# Patient Record
Sex: FEMALE | URBAN - METROPOLITAN AREA
[De-identification: names, ages, dates, MRNs, and addresses within clinical notes are randomized per-mention and may not be internally consistent; named-entity substitution may affect disease eponyms.]

---

## 2018-12-05 ENCOUNTER — APPOINTMENT (RX ONLY)
Dept: URBAN - METROPOLITAN AREA CLINIC 87 | Facility: CLINIC | Age: 83
Setting detail: DERMATOLOGY
End: 2018-12-05

## 2018-12-05 PROBLEM — K21.9 GASTRO-ESOPHAGEAL REFLUX DISEASE WITHOUT ESOPHAGITIS: Status: ACTIVE | Noted: 2018-12-05

## 2018-12-05 PROBLEM — C44.612 BASAL CELL CARCINOMA OF SKIN OF RIGHT UPPER LIMB, INCLUDING SHOULDER: Status: ACTIVE | Noted: 2018-12-05

## 2018-12-05 PROBLEM — Z85.828 PERSONAL HISTORY OF OTHER MALIGNANT NEOPLASM OF SKIN: Status: ACTIVE | Noted: 2018-12-05

## 2018-12-05 PROCEDURE — ? COUNSELING

## 2018-12-05 PROCEDURE — 99202 OFFICE O/P NEW SF 15 MIN: CPT

## 2018-12-05 PROCEDURE — ? OTHER

## 2018-12-05 NOTE — HPI: SKIN LESION (BASAL CELL CARCINOMA)
How Severe Is Your Skin Cancer?: severe
Is This A New Presentation, Or A Follow-Up?: Basal Cell Carcinoma
Location From Outside Provider (Will Override Previously Chosen Location): Right anterior shoulder
When Was Basal Cell Biopsied? (Optional): 1.[address] Rizzo

## 2018-12-05 NOTE — PROCEDURE: OTHER
Other (Free Text): BCC biopsied 1.5 years ago and patient did not have treated due to the fact that she refuses to go to [facility]. Patient reports she did have CAT scan and per patient clear. Previous biopsy requested from Dr. Rizzo (medical release form signed today). Referred patient to Dr. Theodore Vigil at [facility] for surgical options again patient refuses to go to [facility]. Patient also evaluated by Dr. Lancaster and the importance of getting this taken care of reviewed with patient in detail.
Detail Level: Zone
Note Text (......Xxx Chief Complaint.): This diagnosis correlates with the